# Patient Record
Sex: FEMALE | Race: WHITE | NOT HISPANIC OR LATINO | Employment: OTHER | URBAN - METROPOLITAN AREA
[De-identification: names, ages, dates, MRNs, and addresses within clinical notes are randomized per-mention and may not be internally consistent; named-entity substitution may affect disease eponyms.]

---

## 2017-12-12 NOTE — PATIENT DISCUSSION
Basic Near: Patient educated with the Basic option and a near vision goal, there is no guarantee of achieving near vision without glasses after surgery. They will most likely need prescription glasses at all focal points. Patient elects Basic OD, goal of -2.00.

## 2018-01-30 NOTE — PATIENT DISCUSSION
Cataract surgery has been performed in the first Eye and activities of daily living are still impaired The patient would like to proceed with cataract surgery in the second eye as scheduled. The option to not proceed with the second eye has been discussed, but the patient would like to proceed.

## 2018-02-01 NOTE — PROCEDURE NOTE: CLINICAL
PROCEDURE NOTE: Epilation #3 Right Upper Lid. Anesthesia: Topical. Prior to treatment, the risks/benefits/alternatives were discussed. The patient wished to proceed with procedure. Aberrant lashes removed from RUL lid(s) using microforcep. Patient tolerated procedure well. There were no complications. Post-op instructions given. Kristal Farris

## 2018-03-22 NOTE — PROCEDURE NOTE: SURGICAL
<p>Prior to commencing surgery patient identification, surgical procedure, site, and side were confirmed by Dr. Han Maher. Following topical proparacaine anesthesia, the patient was positioned at the YAG laser, a contact lens coupled to the cornea with methylcellulose and an axial posterior capsulotomy performed without complication using 3.2 Mj x 18. Excess methylcellulose was washed from the eye, one drop of Alphagan was instilled and the patient returned to the holding area having tolerated the procedure well and without complication. </p><p>MRN [id number]</p>

## 2018-08-25 NOTE — PROCEDURE NOTE: CLINICAL
PROCEDURE NOTE: Bandage Contact Lens #1 OD. Diagnosis: Corneal Erosion. A therapeutic soft contact lens of the of the appropriate size and base curve was selected and then applied to the cornea. The lens fit well and moved appropriately. Samanta Marin PROCEDURE NOTE: Punctal Plugs, Silicone #1 Right Lower Lid. Prior to treatment, the risks/benefits/alternatives were discussed. The patient wished to proceed with procedure. Permanent silicone plugs were inserted. Patient tolerated procedure well. There were no complications. Post procedure instructions given. Size . 5. Samanta Marin

## 2019-04-24 ENCOUNTER — NEW PATIENT EMERGENCY (OUTPATIENT)
Dept: URBAN - METROPOLITAN AREA CLINIC 43 | Facility: CLINIC | Age: 84
End: 2019-04-24

## 2019-04-24 DIAGNOSIS — H00.11: ICD-10-CM

## 2019-04-24 PROCEDURE — 92002 INTRM OPH EXAM NEW PATIENT: CPT

## 2019-04-24 RX ORDER — NEOMYCIN SULFATE, POLYMYXIN B SULFATE AND DEXAMETHASONE 3.5; 10000; 1 MG/G; [USP'U]/G; MG/G: OINTMENT OPHTHALMIC EVERY EVENING

## 2019-04-24 ASSESSMENT — VISUAL ACUITY
OD_SC: 20/25-3
OS_SC: 20/30-2

## 2019-04-24 ASSESSMENT — TONOMETRY
OS_IOP_MMHG: 13
OD_IOP_MMHG: 13